# Patient Record
Sex: MALE | Race: WHITE | NOT HISPANIC OR LATINO | URBAN - METROPOLITAN AREA
[De-identification: names, ages, dates, MRNs, and addresses within clinical notes are randomized per-mention and may not be internally consistent; named-entity substitution may affect disease eponyms.]

---

## 2022-06-22 ENCOUNTER — EMERGENCY (EMERGENCY)
Facility: HOSPITAL | Age: 52
LOS: 1 days | Discharge: HOME | End: 2022-06-22
Attending: EMERGENCY MEDICINE
Payer: COMMERCIAL

## 2022-06-22 VITALS
HEART RATE: 84 BPM | SYSTOLIC BLOOD PRESSURE: 152 MMHG | OXYGEN SATURATION: 97 % | WEIGHT: 266.98 LBS | TEMPERATURE: 98 F | RESPIRATION RATE: 20 BRPM | DIASTOLIC BLOOD PRESSURE: 80 MMHG

## 2022-06-22 DIAGNOSIS — F41.8 OTHER SPECIFIED ANXIETY DISORDERS: ICD-10-CM

## 2022-06-22 DIAGNOSIS — F41.0 PANIC DISORDER [EPISODIC PAROXYSMAL ANXIETY]: ICD-10-CM

## 2022-06-22 LAB
ALBUMIN SERPL ELPH-MCNC: 4 G/DL — SIGNIFICANT CHANGE UP (ref 3.3–5.2)
ALP SERPL-CCNC: 84 U/L — SIGNIFICANT CHANGE UP (ref 40–120)
ALT FLD-CCNC: 23 U/L — SIGNIFICANT CHANGE UP
ANION GAP SERPL CALC-SCNC: 12 MMOL/L — SIGNIFICANT CHANGE UP (ref 5–17)
APPEARANCE UR: CLEAR — SIGNIFICANT CHANGE UP
AST SERPL-CCNC: 18 U/L — SIGNIFICANT CHANGE UP
BACTERIA # UR AUTO: ABNORMAL
BASOPHILS # BLD AUTO: 0.08 K/UL — SIGNIFICANT CHANGE UP (ref 0–0.2)
BASOPHILS NFR BLD AUTO: 0.9 % — SIGNIFICANT CHANGE UP (ref 0–2)
BILIRUB SERPL-MCNC: 0.5 MG/DL — SIGNIFICANT CHANGE UP (ref 0.4–2)
BILIRUB UR-MCNC: NEGATIVE — SIGNIFICANT CHANGE UP
BUN SERPL-MCNC: 13.9 MG/DL — SIGNIFICANT CHANGE UP (ref 8–20)
CALCIUM SERPL-MCNC: 8.8 MG/DL — SIGNIFICANT CHANGE UP (ref 8.6–10.2)
CHLORIDE SERPL-SCNC: 105 MMOL/L — SIGNIFICANT CHANGE UP (ref 98–107)
CO2 SERPL-SCNC: 23 MMOL/L — SIGNIFICANT CHANGE UP (ref 22–29)
COLOR SPEC: YELLOW — SIGNIFICANT CHANGE UP
CREAT SERPL-MCNC: 0.55 MG/DL — SIGNIFICANT CHANGE UP (ref 0.5–1.3)
DIFF PNL FLD: NEGATIVE — SIGNIFICANT CHANGE UP
EGFR: 119 ML/MIN/1.73M2 — SIGNIFICANT CHANGE UP
EOSINOPHIL # BLD AUTO: 0.13 K/UL — SIGNIFICANT CHANGE UP (ref 0–0.5)
EOSINOPHIL NFR BLD AUTO: 1.5 % — SIGNIFICANT CHANGE UP (ref 0–6)
EPI CELLS # UR: NEGATIVE — SIGNIFICANT CHANGE UP
ETHANOL SERPL-MCNC: <10 MG/DL — SIGNIFICANT CHANGE UP (ref 0–9)
GLUCOSE SERPL-MCNC: 132 MG/DL — HIGH (ref 70–99)
GLUCOSE UR QL: NEGATIVE MG/DL — SIGNIFICANT CHANGE UP
HCT VFR BLD CALC: 43.7 % — SIGNIFICANT CHANGE UP (ref 39–50)
HGB BLD-MCNC: 15.1 G/DL — SIGNIFICANT CHANGE UP (ref 13–17)
IMM GRANULOCYTES NFR BLD AUTO: 0.1 % — SIGNIFICANT CHANGE UP (ref 0–1.5)
KETONES UR-MCNC: NEGATIVE — SIGNIFICANT CHANGE UP
LEUKOCYTE ESTERASE UR-ACNC: NEGATIVE — SIGNIFICANT CHANGE UP
LYMPHOCYTES # BLD AUTO: 1.92 K/UL — SIGNIFICANT CHANGE UP (ref 1–3.3)
LYMPHOCYTES # BLD AUTO: 22.2 % — SIGNIFICANT CHANGE UP (ref 13–44)
MCHC RBC-ENTMCNC: 30.1 PG — SIGNIFICANT CHANGE UP (ref 27–34)
MCHC RBC-ENTMCNC: 34.6 GM/DL — SIGNIFICANT CHANGE UP (ref 32–36)
MCV RBC AUTO: 87.1 FL — SIGNIFICANT CHANGE UP (ref 80–100)
MONOCYTES # BLD AUTO: 0.49 K/UL — SIGNIFICANT CHANGE UP (ref 0–0.9)
MONOCYTES NFR BLD AUTO: 5.7 % — SIGNIFICANT CHANGE UP (ref 2–14)
NEUTROPHILS # BLD AUTO: 6.03 K/UL — SIGNIFICANT CHANGE UP (ref 1.8–7.4)
NEUTROPHILS NFR BLD AUTO: 69.6 % — SIGNIFICANT CHANGE UP (ref 43–77)
NITRITE UR-MCNC: NEGATIVE — SIGNIFICANT CHANGE UP
PH UR: 8 — SIGNIFICANT CHANGE UP (ref 5–8)
PLATELET # BLD AUTO: 317 K/UL — SIGNIFICANT CHANGE UP (ref 150–400)
POTASSIUM SERPL-MCNC: 4.3 MMOL/L — SIGNIFICANT CHANGE UP (ref 3.5–5.3)
POTASSIUM SERPL-SCNC: 4.3 MMOL/L — SIGNIFICANT CHANGE UP (ref 3.5–5.3)
PROT SERPL-MCNC: 7 G/DL — SIGNIFICANT CHANGE UP (ref 6.6–8.7)
PROT UR-MCNC: NEGATIVE — SIGNIFICANT CHANGE UP
RAPID RVP RESULT: SIGNIFICANT CHANGE UP
RBC # BLD: 5.02 M/UL — SIGNIFICANT CHANGE UP (ref 4.2–5.8)
RBC # FLD: 11.7 % — SIGNIFICANT CHANGE UP (ref 10.3–14.5)
RBC CASTS # UR COMP ASSIST: NEGATIVE /HPF — SIGNIFICANT CHANGE UP (ref 0–4)
SALICYLATES SERPL-MCNC: <0.6 MG/DL — LOW (ref 10–20)
SARS-COV-2 RNA SPEC QL NAA+PROBE: SIGNIFICANT CHANGE UP
SODIUM SERPL-SCNC: 140 MMOL/L — SIGNIFICANT CHANGE UP (ref 135–145)
SP GR SPEC: 1.01 — SIGNIFICANT CHANGE UP (ref 1.01–1.02)
TSH SERPL-MCNC: 0.79 UIU/ML — SIGNIFICANT CHANGE UP (ref 0.27–4.2)
UROBILINOGEN FLD QL: NEGATIVE MG/DL — SIGNIFICANT CHANGE UP
WBC # BLD: 8.66 K/UL — SIGNIFICANT CHANGE UP (ref 3.8–10.5)
WBC # FLD AUTO: 8.66 K/UL — SIGNIFICANT CHANGE UP (ref 3.8–10.5)
WBC UR QL: NEGATIVE /HPF — SIGNIFICANT CHANGE UP (ref 0–5)

## 2022-06-22 PROCEDURE — 99285 EMERGENCY DEPT VISIT HI MDM: CPT

## 2022-06-22 PROCEDURE — 93010 ELECTROCARDIOGRAM REPORT: CPT

## 2022-06-22 PROCEDURE — 36415 COLL VENOUS BLD VENIPUNCTURE: CPT

## 2022-06-22 PROCEDURE — 99284 EMERGENCY DEPT VISIT MOD MDM: CPT

## 2022-06-22 PROCEDURE — 93005 ELECTROCARDIOGRAM TRACING: CPT

## 2022-06-22 PROCEDURE — 80307 DRUG TEST PRSMV CHEM ANLYZR: CPT

## 2022-06-22 PROCEDURE — 84443 ASSAY THYROID STIM HORMONE: CPT

## 2022-06-22 PROCEDURE — 90792 PSYCH DIAG EVAL W/MED SRVCS: CPT

## 2022-06-22 PROCEDURE — 81001 URINALYSIS AUTO W/SCOPE: CPT

## 2022-06-22 PROCEDURE — 85025 COMPLETE CBC W/AUTO DIFF WBC: CPT

## 2022-06-22 PROCEDURE — 0225U NFCT DS DNA&RNA 21 SARSCOV2: CPT

## 2022-06-22 PROCEDURE — 80053 COMPREHEN METABOLIC PANEL: CPT

## 2022-06-22 RX ORDER — BUPROPION HYDROCHLORIDE 150 MG/1
1 TABLET, EXTENDED RELEASE ORAL
Qty: 14 | Refills: 0
Start: 2022-06-22 | End: 2022-07-05

## 2022-06-22 RX ORDER — HYDROXYZINE HCL 10 MG
1 TABLET ORAL
Qty: 28 | Refills: 0
Start: 2022-06-22 | End: 2022-07-05

## 2022-06-22 RX ORDER — ALPRAZOLAM 0.25 MG
0.5 TABLET ORAL ONCE
Refills: 0 | Status: DISCONTINUED | OUTPATIENT
Start: 2022-06-22 | End: 2022-06-22

## 2022-06-22 RX ADMIN — Medication 0.5 MILLIGRAM(S): at 09:04

## 2022-06-22 NOTE — ED PROVIDER NOTE - NSFOLLOWUPINSTRUCTIONS_ED_ALL_ED_FT
1. follow up with your psychiatrist  2. take your meds regularly  3. return promptly in c/o worsening symptoms

## 2022-06-22 NOTE — ED BEHAVIORAL HEALTH ASSESSMENT NOTE - RISK ASSESSMENT
Low Acute Suicide Risk RF: Acute panic/anxiety, hx of mood disorder, impulsivity, hx fo substance abuse    PF: Good social support, engaged in outpatient treatment, No hx of suicide attempt, no access to weapons, no familial hx of suicide, no prior psychiatric hospitalizations.

## 2022-06-22 NOTE — ED BEHAVIORAL HEALTH ASSESSMENT NOTE - NS ED B[facility] ED COURSE PSYCHIATRIC MEDICATION GIVEN
No fried, spicy or greasy foods. Increase fluids as tolerated  If your doctor prescribed narcotic pain medications after your procedure to help prevent and reduce constipation, increase fluid intake and fiber intake in your diet. You may take OTC Stool Softeners such as Colace to help reduce constipation. None

## 2022-06-22 NOTE — ED PROVIDER NOTE - OBJECTIVE STATEMENT
50 y/o M wot h/o anxiety, depression, dm on metformin and Trulicity p/w feeling depressed and low and very anxious since this morning. No fall or trauma. No SI, HI. Pt denies nay drug or alcohol use. Pt denies nay inpatient psych admission in the past and is on meds for depression and wants to speak to a psychiatrist

## 2022-06-22 NOTE — ED PROVIDER NOTE - PATIENT PORTAL LINK FT
You can access the FollowMyHealth Patient Portal offered by [facility] by registering at the following website: [email or web address]. By joining [employer]’s FollowMyHealth portal, you will also be able to view your health information using other applications (apps) compatible with our system.

## 2022-06-22 NOTE — ED ADULT TRIAGE NOTE - CHIEF COMPLAINT QUOTE
Coming to ED C/O increasing depression. PT report he was on his way to work this morning when he became very upset and started having a panic attack.  States he has had stress in his life with paying bills.  Hx of depression and has been taking Bupropion and Escitalopram. Denies SI/HI, drug or alcohol use.

## 2022-06-22 NOTE — ED ADULT NURSE NOTE - OBJECTIVE STATEMENT
Pt walked in c/o panic attack this AM hx depression on meds and following psych , presents calm and cooperative denies any si thoughts at this time in no acute distress elena avery

## 2022-06-22 NOTE — ED BEHAVIORAL HEALTH ASSESSMENT NOTE - DESCRIPTION
DM 15.1   8.66  )-----------( 317      ( 22 Jun 2022 08:59 )             43.7   140  |  105  |  13.9  ----------------------------<  132<H>  4.3   |  23.0  |  0.55  Ca    8.8      22 Jun 2022 08:59  TPro  7.0  /  Alb  4.0  /  TBili  0.5  /  DBili  x   /  AST  18  /  ALT  23  /  AlkPhos  84  06-22    UDS pending Patient is currently working full time in a union as a  at [employer], he resides in [place] with his family.

## 2022-06-22 NOTE — ED BEHAVIORAL HEALTH ASSESSMENT NOTE - HPI (INCLUDE ILLNESS QUALITY, SEVERITY, DURATION, TIMING, CONTEXT, MODIFYING FACTORS, ASSOCIATED SIGNS AND SYMPTOMS)
52 y/o M wot h/o anxiety, depression, dm on metformin and Trulicity p/w feeling depressed and low and very anxious since this morning. Psychiatry consulted for mood disorder.    Patient was seen and evaluated in the ED, wife at bedside,. patient interviewed in private. Patient presents well groomed, calm, cooperative, superficially pleasant, well related, and actively engaged in interview. Patient reports mood as depressed. He reports that last night he was upset at dinner time and became overwhelmed with thoughts of finances. He reports increased external psychosocial stressors including children and work. He reports that he woke up this morning feeling sad, left for work, was driving and became anxious. He reports that he had to pull over in a parking lot, reports not knowing where he was or how he got there. He reports he was "sobbing," and I called 911. He admits to passive thoughts of suicide, denies intent or plan. Patient reports the following depressive mood features including feeling like a failure, helplessness, shame regarding relying on others for monetary assistance, increased irritability, tearfulness, and worsening motivation. Patient endorses worsening anxiety with increased restlessness, racing thoughts and worries over his future/finances " I am just having trouble keeping up." He reports feeling preoccupied with thoughts of pay bills. He reports having 1 prior panic attack 5 years ago in which he had chest pain and came to the ER for full work up without clinical findings.     Patient endorces a hx of depression diagnosed 3 years ago, was started on Wellbutrin for irritability, impulsivity and weight control benefits, He was later started on escitalopram for ongoing mood features. Patient taking the following medications  Wellbutrin XL 300mg orally once daily, Escitalopram 30mg orally once daily. Patient has hx of Percocet abuse, at max was taking 16 tablets (10mg tabs) daily. Last reported use was 15 years ago, he is currently taking Suboxone 8mg/2mg. He is connected to outpatient psychiatry at [facility].     Patient denies hx of enedelia. No reported manic symptoms including elevated mood, increased irritability, mood lability, distractibility, grandiosity, pressured speech, increase in goal-directed activity, or decreased need for sleep. No AVH, no delusions or paranoia were elicited. He is able to identify his family and children as protective factors.    Spoke with his wife Christen ([phone]) for collateral, she reports the above to be true. She is in agreement for patient to return home and will follow up with his outpatient provider.

## 2022-06-22 NOTE — ED PROVIDER NOTE - NSFOLLOWUPCLINICS_GEN_ALL_ED_FT
[facility]  Psychiatry  [address] [place] [address]  Phone: [phone]  Fax:   Follow Up Time: 1-3 Days

## 2023-05-16 NOTE — ED ADULT TRIAGE NOTE - NS_BH TRG QUESTION3_ED_ALL_ED
Care Transitions Initial Follow Up Call    Outreach made within 2 business days of discharge: Yes    Patient: Nela Fisher Patient : 1972   MRN: [id number]  Reason for Admission: There are no discharge diagnoses documented for the most recent discharge. Discharge Date: 5/15/23       Spoke with: VM message left with patient to return call to this nurse. Phone number left for patient to call. Discharge department/facility: [facility]      Scheduled appointment with PCP within 7-14 days Pt is seen at Dr. Benjamin Samano private office. Follow Up  No future appointments.     Lucien Doyle RN
No

## 2024-01-05 NOTE — ED BEHAVIORAL HEALTH ASSESSMENT NOTE - SUMMARY
No protocol for requested medication     Medication: traMADol (ULTRAM) 50 MG tablet   Last office visit date: 05/02/2023  Pharmacy: [facility] [address] [place] [address], [address] [place] [address]    Order pended, routed to clinician for review. Orders pended, and routed to provider's nurse pool for review and or approval.   Please route any notes back to your nursing pool.        Thank you, Refill Center Staff Patient is a 51 year old male, , employed [employer], domiciled with family in private home. PPHX of depression, anxiety, panic, no past psych hospitalizations, hx of opioid abuse, currently on MAT (suboxone), connected to outpatient psychiatry through [facility]. PHMX of DM.    Patient was seen and evaluated, AOX4, presents with depressed/anxious/ full affect with mood congruent features. He describes ongoing depressive mood features with worsening anxiety in the setting of increased external psychosocial stressors. Evidence of MDE, recurrent, moderate, Anxiety disorder, unspecified, with Panic.  Patient would benefit from tapering down Wellbutrin may be worsening anxiety. Patient will be given RX for Hydroxyzine for acute episode of worsening anxiety and panic. Patient is amenable to discharge home and will follow up with his outpatient provider within the next two weeks. Patient and wife are in agreement with plan. He is able to effectively safety plan.    Plan:  Hydroxyzine 50mg orally BID as needed for anxiety (RX sent to pharmacy)  Taper Wellbutrin XL to 150mg orally once daily (RX sent to pharmacy)  Safety: No acute safety concerns  Psych dispo: Cleared will follow up with outpatient